# Patient Record
Sex: FEMALE | Race: OTHER
[De-identification: names, ages, dates, MRNs, and addresses within clinical notes are randomized per-mention and may not be internally consistent; named-entity substitution may affect disease eponyms.]

---

## 2017-09-28 ENCOUNTER — HOSPITAL ENCOUNTER (EMERGENCY)
Dept: HOSPITAL 61 - ER | Age: 47
Discharge: HOME | End: 2017-09-28
Payer: COMMERCIAL

## 2017-09-28 VITALS — HEIGHT: 65 IN | BODY MASS INDEX: 24.99 KG/M2 | WEIGHT: 150 LBS

## 2017-09-28 VITALS — SYSTOLIC BLOOD PRESSURE: 107 MMHG | DIASTOLIC BLOOD PRESSURE: 66 MMHG
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
## 2017-09-28 DIAGNOSIS — S40.012A: ICD-10-CM

## 2017-09-28 DIAGNOSIS — S52.002A: Primary | ICD-10-CM
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
## 2017-09-28 DIAGNOSIS — S13.4XXA: ICD-10-CM

## 2017-09-28 PROCEDURE — 73030 X-RAY EXAM OF SHOULDER: CPT

## 2017-09-28 PROCEDURE — 99284 EMERGENCY DEPT VISIT MOD MDM: CPT

## 2017-09-28 PROCEDURE — 72040 X-RAY EXAM NECK SPINE 2-3 VW: CPT

## 2017-09-28 PROCEDURE — 73080 X-RAY EXAM OF ELBOW: CPT
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
## 2018-11-16 ENCOUNTER — HOSPITAL ENCOUNTER (EMERGENCY)
Dept: HOSPITAL 61 - ER | Age: 48
Discharge: HOME | End: 2018-11-16
Payer: SELF-PAY

## 2018-11-16 VITALS — BODY MASS INDEX: 28.32 KG/M2 | WEIGHT: 170 LBS | HEIGHT: 65 IN

## 2018-11-16 VITALS — SYSTOLIC BLOOD PRESSURE: 118 MMHG | DIASTOLIC BLOOD PRESSURE: 55 MMHG

## 2018-11-16 DIAGNOSIS — L01.03: Primary | ICD-10-CM

## 2018-11-16 PROCEDURE — 99283 EMERGENCY DEPT VISIT LOW MDM: CPT

## 2018-11-16 NOTE — PHYS DOC
Past Medical History


Past Medical History:  No Pertinent History


Past Surgical History:  No Surgical History


Alcohol Use:  None


Drug Use:  None





Adult General


Chief Complaint


Chief Complaint:  SKIN RASH/ABSCESS





HPI


HPI





Patient is a 48  year old female who presents to the ER with complaints of a 

rash on bilateral wrists that itches since yesterday. She denies any new 

environmental exposures, medications, foods, or detergents. She states that 

this morning she noticed the redness is spreading to her face. Pt denies any SOA

, cough, congestion, or wheezing. She states that the only animal she has been 

around is her cat.





Review of Systems


Review of Systems





Constitutional: Denies fever or chills []


Eyes: Denies change in visual acuity, redness, or eye pain []


HENT: Denies nasal congestion or sore throat, see HPI[]


Respiratory: Denies cough or shortness of breath []


Integument: See HPI


Neurologic: Denies headache, focal weakness or sensory changes []








All other systems were reviewed and found to be within normal limits, except as 

documented in this note.





Allergies


Allergies





Allergies








Coded Allergies Type Severity Reaction Last Updated Verified


 


  No Known Drug Allergies    9/28/17 No











Physical Exam


Physical Exam





Constitutional: Well developed, well nourished, no acute distress, non-toxic 

appearance. []


HENT: Normocephalic, atraumatic, bilateral external ears normal, oropharynx 

moist, no oral exudates, nose normal. []


Eyes: PERRLA, conjunctiva normal, no discharge. [] 


Cardiovascular:Heart rate regular rhythm, no murmur []


Lungs & Thorax:  Bilateral breath sounds clear to auscultation []


Skin: Warm, dry; honey crusted erythremic bullous lesions noted to bilateral 

wrists, red raised patches on face, consistent with impetigo


Extremities: No tenderness, no cyanosis, no clubbing, ROM intact, no edema. [] 


Neurologic: Alert and oriented X 3, normal motor function, normal sensory 

function, no focal deficits noted. []


Psychologic: Affect normal, judgement normal, mood normal. []





Current Patient Data


Vital Signs





 Vital Signs








  Date Time  Temp Pulse Resp B/P (MAP) Pulse Ox O2 Delivery O2 Flow Rate FiO2


 


11/16/18 11:44 98.2 76 16 118/55 (76) 98 Room Air  





 98.2       











EKG


EKG


[]





Radiology/Procedures


Radiology/Procedures


[]





Course & Med Decision Making


Course & Med Decision Making


Pertinent Labs and Imaging studies reviewed. (See chart for details)





[]





Dragon Disclaimer


Dragon Disclaimer


This electronic medical record was generated, in whole or in part, using a 

voice recognition dictation system.





Departure


Departure


Impression:  


 Primary Impression:  


 Impetigo any site


Disposition:  01 HOME, SELF-CARE


Condition:  STABLE


Referrals:  


BERNARD ANTHONY DO (PCP)


Patient Instructions:  Impetigo





Additional Instructions:  


Keep fingernails trimmed short, apply antibiotic ointment underneath nails in 

addition to rashy areas. Follow up with PCP next week, return to ER if symptoms 

worsen.


Scripts


Mupirocin (MUPIROCIN OINTMENT) 22 Gm Oint...g.


1 SURINDER TP TID for WOUND CARE for 5 Days, #1 TUBE 0 Refills


   Prov: KANDACE YAN APRN         11/16/18 


Cephalexin (CEPHALEXIN) 500 Mg Tablet


1 TAB PO BID for 7 Days, #14 TAB


   Prov: KANDACE YAN APRN         11/16/18











KANDACE YAN APRN Nov 16, 2018 12:15